# Patient Record
Sex: MALE | Race: WHITE | NOT HISPANIC OR LATINO | URBAN - NONMETROPOLITAN AREA
[De-identification: names, ages, dates, MRNs, and addresses within clinical notes are randomized per-mention and may not be internally consistent; named-entity substitution may affect disease eponyms.]

---

## 2020-03-05 ENCOUNTER — SKIN CHECK (OUTPATIENT)
Dept: URBAN - NONMETROPOLITAN AREA CLINIC 9 | Facility: CLINIC | Age: 60
Setting detail: DERMATOLOGY
End: 2020-03-05

## 2020-03-05 DIAGNOSIS — D04.39 CARCINOMA IN SITU OF SKIN OF OTHER PARTS OF FACE: ICD-10-CM

## 2020-03-05 PROCEDURE — 99213 OFFICE O/P EST LOW 20 MIN: CPT

## 2020-03-05 RX ORDER — TRIAMCINOLONE ACETONIDE 1 MG/G
A SMALL AMOUNT CREAM TOPICAL TWICE A DAY
Qty: 454 | Refills: 2
Start: 2020-03-05

## 2020-03-16 ENCOUNTER — RX ONLY (RX ONLY)
Age: 60
End: 2020-03-16

## 2020-03-16 RX ORDER — BETAMETHASONE DIPROPIONATE 0.5 MG/G
1 A SMALL AMOUNT CREAM TOPICAL ONCE A DAY
Qty: 45 | Refills: 3
Start: 2020-03-16

## 2023-07-27 ENCOUNTER — WEB ENCOUNTER (OUTPATIENT)
Dept: URBAN - METROPOLITAN AREA CLINIC 74 | Facility: CLINIC | Age: 63
End: 2023-07-27

## 2023-08-01 ENCOUNTER — WEB ENCOUNTER (OUTPATIENT)
Dept: URBAN - METROPOLITAN AREA CLINIC 40 | Facility: CLINIC | Age: 63
End: 2023-08-01

## 2023-08-01 ENCOUNTER — OFFICE VISIT (OUTPATIENT)
Dept: URBAN - METROPOLITAN AREA CLINIC 74 | Facility: CLINIC | Age: 63
End: 2023-08-01
Payer: COMMERCIAL

## 2023-08-01 ENCOUNTER — LAB OUTSIDE AN ENCOUNTER (OUTPATIENT)
Dept: URBAN - METROPOLITAN AREA CLINIC 74 | Facility: CLINIC | Age: 63
End: 2023-08-01

## 2023-08-01 VITALS
HEART RATE: 69 BPM | BODY MASS INDEX: 31.96 KG/M2 | WEIGHT: 215.8 LBS | SYSTOLIC BLOOD PRESSURE: 126 MMHG | DIASTOLIC BLOOD PRESSURE: 68 MMHG | TEMPERATURE: 97.7 F | HEIGHT: 69 IN

## 2023-08-01 DIAGNOSIS — Z98.890 HISTORY OF COLONOSCOPY: ICD-10-CM

## 2023-08-01 DIAGNOSIS — R12 HEARTBURN: ICD-10-CM

## 2023-08-01 DIAGNOSIS — Z12.11 COLON CANCER SCREENING: ICD-10-CM

## 2023-08-01 PROCEDURE — 99202 OFFICE O/P NEW SF 15 MIN: CPT | Performed by: NURSE PRACTITIONER

## 2023-08-01 RX ORDER — OMEPRAZOLE 40 MG/1
1 CAPSULE 30 MINUTES BEFORE MORNING MEAL CAPSULE, DELAYED RELEASE ORAL ONCE A DAY
Status: ACTIVE | COMMUNITY

## 2023-08-01 RX ORDER — MV-MIN/FA/VIT K/LUTEIN/ZEAXANT 200MCG-5MG
AS DIRECTED CAPSULE ORAL
Status: ACTIVE | COMMUNITY

## 2023-08-01 RX ORDER — ALPRAZOLAM 0.5 MG/1
0.5 TABLET TABLET ORAL
Status: ACTIVE | COMMUNITY
Start: 2023-07-31

## 2023-08-01 NOTE — HPI-TODAY'S VISIT:
62 y/o male new patient with PMH as listed below.  02/14/2023: Symptomatic cholelithiasis with GS 12/15/2022: CT- 1.  No definite acute abnormality in the abdomen or pelvis. 2.  Mild stranding of the mid abdominal mesentery which is nonspecific, but can be seen with mesenteric panniculitis among other causes. 3.  Mild diffuse gallbladder wall thickening is more suggestive of a systemic etiology than acute cholecystitis, such as hepatic dysfunction or hepatitis. This could be further evaluated with ultrasound if pain localizes to this region. 4. Hepatic steatosis. 5.  Colonic diverticulosis.  08/01/2023: He is here to schedule his screening colonoscopy.  He states that he was scheduled for another one somewhere else but after issues with trying to get in touch with that office he decided to switch providers.  He has recently had a cholecystectomy earlier this year. He states  that since then his heartburn symptoms have lessened, but he still continues on omeprazole daily for about a year from his primary care provider and occasional Tums. He denies any reflux, globus, dysphagia.  He denies any abdominal pain, dark stools, hematochezia, rectal pain, diarrhea or constipation.  He states he has noticed his stools are a little looser but that has been going on for a while now. No concerns or complaints. He denies NSAID, blood thinner, Pacemaker, O2 use, stroke, seizure.

## 2023-08-15 ENCOUNTER — WEB ENCOUNTER (OUTPATIENT)
Dept: URBAN - METROPOLITAN AREA SURGERY CENTER 30 | Facility: SURGERY CENTER | Age: 63
End: 2023-08-15

## 2023-08-18 ENCOUNTER — OFFICE VISIT (OUTPATIENT)
Dept: URBAN - METROPOLITAN AREA SURGERY CENTER 30 | Facility: SURGERY CENTER | Age: 63
End: 2023-08-18
Payer: COMMERCIAL

## 2023-08-18 DIAGNOSIS — D12.3 ADENOMA OF TRANSVERSE COLON: ICD-10-CM

## 2023-08-18 DIAGNOSIS — Z12.11 COLON CANCER SCREENING: ICD-10-CM

## 2023-08-18 DIAGNOSIS — D12.2 ADENOMA OF ASCENDING COLON: ICD-10-CM

## 2023-08-18 PROCEDURE — 45385 COLONOSCOPY W/LESION REMOVAL: CPT | Performed by: INTERNAL MEDICINE

## 2023-08-18 PROCEDURE — G8907 PT DOC NO EVENTS ON DISCHARG: HCPCS | Performed by: INTERNAL MEDICINE

## 2023-08-18 RX ORDER — OMEPRAZOLE 40 MG/1
1 CAPSULE 30 MINUTES BEFORE MORNING MEAL CAPSULE, DELAYED RELEASE ORAL ONCE A DAY
Status: ACTIVE | COMMUNITY

## 2023-08-18 RX ORDER — MV-MIN/FA/VIT K/LUTEIN/ZEAXANT 200MCG-5MG
AS DIRECTED CAPSULE ORAL
Status: ACTIVE | COMMUNITY

## 2023-08-18 RX ORDER — ALPRAZOLAM 0.5 MG/1
0.5 TABLET TABLET ORAL
Status: ACTIVE | COMMUNITY
Start: 2023-07-31

## 2023-09-12 ENCOUNTER — DASHBOARD ENCOUNTERS (OUTPATIENT)
Age: 63
End: 2023-09-12

## 2023-09-12 ENCOUNTER — OFFICE VISIT (OUTPATIENT)
Dept: URBAN - METROPOLITAN AREA CLINIC 74 | Facility: CLINIC | Age: 63
End: 2023-09-12
Payer: COMMERCIAL

## 2023-09-12 VITALS
BODY MASS INDEX: 32.02 KG/M2 | SYSTOLIC BLOOD PRESSURE: 138 MMHG | WEIGHT: 216.2 LBS | TEMPERATURE: 97.7 F | HEIGHT: 69 IN | HEART RATE: 57 BPM | DIASTOLIC BLOOD PRESSURE: 78 MMHG

## 2023-09-12 DIAGNOSIS — D12.3 ADENOMATOUS POLYP OF TRANSVERSE COLON: ICD-10-CM

## 2023-09-12 DIAGNOSIS — R12 HEARTBURN: ICD-10-CM

## 2023-09-12 DIAGNOSIS — D12.2 ADENOMATOUS POLYP OF ASCENDING COLON: ICD-10-CM

## 2023-09-12 DIAGNOSIS — K57.50 DIVERTICULOSIS OF BOTH SMALL AND LARGE INTESTINE WITHOUT PERFORATION OR ABSCESS: ICD-10-CM

## 2023-09-12 PROBLEM — 397881000: Status: ACTIVE | Noted: 2023-09-12

## 2023-09-12 PROCEDURE — 99213 OFFICE O/P EST LOW 20 MIN: CPT | Performed by: NURSE PRACTITIONER

## 2023-09-12 RX ORDER — MV-MIN/FA/VIT K/LUTEIN/ZEAXANT 200MCG-5MG
AS DIRECTED CAPSULE ORAL
Status: ACTIVE | COMMUNITY

## 2023-09-12 RX ORDER — OMEPRAZOLE 40 MG/1
1 CAPSULE 30 MINUTES BEFORE MORNING MEAL CAPSULE, DELAYED RELEASE ORAL ONCE A DAY
Status: ACTIVE | COMMUNITY

## 2023-09-12 RX ORDER — ALPRAZOLAM 0.5 MG/1
0.5 TABLET TABLET ORAL
Status: ACTIVE | COMMUNITY
Start: 2023-07-31

## 2023-09-12 NOTE — HPI-TODAY'S VISIT:
64 y/o male with past medical history as listed below, last office visit earlier in August for colon cancer screening and heartburn.  Was holding on EGD at that time advised if heartburn symptoms worsened would consider an EGD.  He had a CT scan December 2022 with no acute abnormality aside from hepatic steatosis and colonic diverticulosis.  Colonoscopy report August 18, 2023,  Dr. Corey, skin tags found on perianal exam, 12 mm polyp found in the ascending colon, semi-sessile, removed with a hot snare, 1 hemostatic clip was successfully placed there was no bleeding at the end of the procedure.  2 sessile polyps were found in the transverse colon 5 to 8 mm in size removed with a cold snare, nonbleeding internal hemorrhoids were found grade 1,  scattered and large mouth diverticula found in the sigmoid, descending and transverse colon.  Discontinue aspirin and NSAIDs for 2 weeks, repeat colonoscopy in 3 years for surveillance.  Pathology report shows that the ascending colon polyp was tubular adenoma, tubular adenoma also with the transverse colon polyps.  He has avoided NSAIDs and has no complaints of dark stools, hematochezia, abdominal pain. He has occasional heartburn but not often, when he does it is at night. Discussed diet and measures to prevent, he may try pepcid at night if needed.